# Patient Record
Sex: FEMALE | Race: WHITE | NOT HISPANIC OR LATINO | URBAN - METROPOLITAN AREA
[De-identification: names, ages, dates, MRNs, and addresses within clinical notes are randomized per-mention and may not be internally consistent; named-entity substitution may affect disease eponyms.]

---

## 2017-10-14 ENCOUNTER — OUTPATIENT (OUTPATIENT)
Dept: OUTPATIENT SERVICES | Facility: HOSPITAL | Age: 52
LOS: 1 days | Discharge: HOME | End: 2017-10-14

## 2017-10-14 DIAGNOSIS — Z12.31 ENCOUNTER FOR SCREENING MAMMOGRAM FOR MALIGNANT NEOPLASM OF BREAST: ICD-10-CM

## 2018-06-02 ENCOUNTER — OUTPATIENT (OUTPATIENT)
Dept: OUTPATIENT SERVICES | Facility: HOSPITAL | Age: 53
LOS: 1 days | Discharge: HOME | End: 2018-06-02

## 2018-06-04 DIAGNOSIS — Z00.01 ENCOUNTER FOR GENERAL ADULT MEDICAL EXAMINATION WITH ABNORMAL FINDINGS: ICD-10-CM

## 2018-06-04 DIAGNOSIS — D64.9 ANEMIA, UNSPECIFIED: ICD-10-CM

## 2018-06-04 DIAGNOSIS — R53.83 OTHER FATIGUE: ICD-10-CM

## 2018-06-04 DIAGNOSIS — E55.9 VITAMIN D DEFICIENCY, UNSPECIFIED: ICD-10-CM

## 2018-09-26 ENCOUNTER — OUTPATIENT (OUTPATIENT)
Dept: OUTPATIENT SERVICES | Facility: HOSPITAL | Age: 53
LOS: 1 days | Discharge: HOME | End: 2018-09-26

## 2018-09-26 ENCOUNTER — RESULT REVIEW (OUTPATIENT)
Age: 53
End: 2018-09-26

## 2018-10-03 DIAGNOSIS — Z12.4 ENCOUNTER FOR SCREENING FOR MALIGNANT NEOPLASM OF CERVIX: ICD-10-CM

## 2018-10-06 ENCOUNTER — OUTPATIENT (OUTPATIENT)
Dept: OUTPATIENT SERVICES | Facility: HOSPITAL | Age: 53
LOS: 1 days | Discharge: HOME | End: 2018-10-06

## 2018-10-06 DIAGNOSIS — M54.5 LOW BACK PAIN: ICD-10-CM

## 2018-10-20 ENCOUNTER — OUTPATIENT (OUTPATIENT)
Dept: OUTPATIENT SERVICES | Facility: HOSPITAL | Age: 53
LOS: 1 days | Discharge: HOME | End: 2018-10-20

## 2018-10-20 DIAGNOSIS — Z12.31 ENCOUNTER FOR SCREENING MAMMOGRAM FOR MALIGNANT NEOPLASM OF BREAST: ICD-10-CM

## 2019-10-15 ENCOUNTER — RESULT REVIEW (OUTPATIENT)
Age: 54
End: 2019-10-15

## 2019-10-15 ENCOUNTER — OUTPATIENT (OUTPATIENT)
Dept: OUTPATIENT SERVICES | Facility: HOSPITAL | Age: 54
LOS: 1 days | Discharge: HOME | End: 2019-10-15

## 2019-10-17 DIAGNOSIS — Z12.4 ENCOUNTER FOR SCREENING FOR MALIGNANT NEOPLASM OF CERVIX: ICD-10-CM

## 2020-04-26 ENCOUNTER — MESSAGE (OUTPATIENT)
Age: 55
End: 2020-04-26

## 2020-05-05 ENCOUNTER — APPOINTMENT (OUTPATIENT)
Dept: DISASTER EMERGENCY | Facility: CLINIC | Age: 55
End: 2020-05-05

## 2020-05-07 ENCOUNTER — APPOINTMENT (OUTPATIENT)
Dept: DISASTER EMERGENCY | Facility: HOSPITAL | Age: 55
End: 2020-05-07

## 2020-05-08 LAB
SARS-COV-2 IGG SERPL IA-ACNC: 7 INDEX
SARS-COV-2 IGG SERPL QL IA: POSITIVE

## 2021-04-16 PROBLEM — Z00.00 ENCOUNTER FOR PREVENTIVE HEALTH EXAMINATION: Status: ACTIVE | Noted: 2021-04-16

## 2021-04-20 ENCOUNTER — TRANSCRIPTION ENCOUNTER (OUTPATIENT)
Age: 56
End: 2021-04-20

## 2021-04-20 ENCOUNTER — APPOINTMENT (OUTPATIENT)
Dept: PSYCHIATRY | Facility: CLINIC | Age: 56
End: 2021-04-20
Payer: COMMERCIAL

## 2021-04-20 PROCEDURE — 90791 PSYCH DIAGNOSTIC EVALUATION: CPT | Mod: 95

## 2021-04-30 ENCOUNTER — APPOINTMENT (OUTPATIENT)
Dept: PSYCHIATRY | Facility: CLINIC | Age: 56
End: 2021-04-30

## 2021-05-03 ENCOUNTER — EMERGENCY (EMERGENCY)
Facility: HOSPITAL | Age: 56
LOS: 0 days | Discharge: HOME | End: 2021-05-03
Attending: STUDENT IN AN ORGANIZED HEALTH CARE EDUCATION/TRAINING PROGRAM | Admitting: STUDENT IN AN ORGANIZED HEALTH CARE EDUCATION/TRAINING PROGRAM
Payer: COMMERCIAL

## 2021-05-03 VITALS
OXYGEN SATURATION: 99 % | RESPIRATION RATE: 18 BRPM | WEIGHT: 240.08 LBS | HEART RATE: 77 BPM | DIASTOLIC BLOOD PRESSURE: 72 MMHG | SYSTOLIC BLOOD PRESSURE: 135 MMHG | TEMPERATURE: 96 F

## 2021-05-03 DIAGNOSIS — I10 ESSENTIAL (PRIMARY) HYPERTENSION: ICD-10-CM
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
## 2021-05-03 DIAGNOSIS — R10.31 RIGHT LOWER QUADRANT PAIN: ICD-10-CM
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
## 2021-05-03 DIAGNOSIS — Z88.0 ALLERGY STATUS TO PENICILLIN: ICD-10-CM

## 2021-05-03 PROCEDURE — 99284 EMERGENCY DEPT VISIT MOD MDM: CPT

## 2021-05-03 PROCEDURE — 73502 X-RAY EXAM HIP UNI 2-3 VIEWS: CPT | Mod: 26,RT

## 2021-05-03 RX ORDER — KETOROLAC TROMETHAMINE 30 MG/ML
15 SYRINGE (ML) INJECTION ONCE
Refills: 0 | Status: DISCONTINUED | OUTPATIENT
Start: 2021-05-03 | End: 2021-05-03

## 2021-05-03 RX ADMIN — Medication 15 MILLIGRAM(S): at 08:19

## 2021-05-03 NOTE — ED PROVIDER NOTE - NSFOLLOWUPINSTRUCTIONS_ED_ALL_ED_FT
- Please follow up with Physical therapy rehab  - You may continue taking your pain medications and rest for the next few days      Groin Pain    WHAT YOU NEED TO KNOW:    Groin pain may be felt only in your groin, or it may spread to your buttocks, thigh, or knee. An injury to your hip joint, pelvic area, lower back, or thighs can cause groin pain.    DISCHARGE INSTRUCTIONS:    Medicines: You may need any of the following:     NSAIDs, such as ibuprofen, help decrease swelling, pain, and fever. This medicine is available with or without a doctor's order. NSAIDs can cause stomach bleeding or kidney problems in certain people. If you take blood thinner medicine, always ask if NSAIDs are safe for you. Always read the medicine label and follow directions. Do not give these medicines to children under 6 months of age without direction from your child's healthcare provider.      Acetaminophen decreases pain. It is available without a doctor's order. Ask how much to take and how often to take it. Follow directions. Acetaminophen can cause liver damage if not taken correctly.       Take your medicine as directed. Contact your healthcare provider if you think your medicine is not helping or if you have side effects. Tell him of her if you are allergic to any medicine. Keep a list of the medicines, vitamins, and herbs you take. Include the amounts, and when and why you take them. Bring the list or the pill bottles to follow-up visits. Carry your medicine list with you in case of an emergency.    Follow up with your healthcare provider as directed: You may need to return for more tests. Write down your questions so you remember to ask them during your visits.     Self-care:     Rest as much as possible. Avoid activities that cause or increase your pain.      Apply ice on your groin for 15 to 20 minutes every hour or as directed. Use an ice pack, or put crushed ice in a plastic bag. Cover it with a towel. Ice helps prevent tissue damage and decreases swelling and pain.       Apply heat on your groin for 20 to 30 minutes every 2 hours for as many days as directed. Heat helps decrease pain and muscle spasms.     Contact your healthcare provider if:     You have a fever.       You have questions or concerns about your condition or care.    Return to the emergency department if:     You have severe pain even after you take medicine.       You have pain or burning when you urinate.       You have pain on your side that spreads to your groin.         © Copyright [employer] 2019 All illustrations and images included in CareNotes are the copyrighted property of [email or web address] or [employer].

## 2021-05-03 NOTE — ED PROVIDER NOTE - PROGRESS NOTE DETAILS
AH - xray unremarkable; pt understands outpatient management, rehab; Patient to be discharged from ED. Any available test results were discussed with patient and/or family. Verbal instructions given, including instructions to return to ED immediately for any new, worsening, or concerning symptoms. Strict return precautions given. Patient endorsed understanding. Written discharge instructions additionally given, including follow-up plan

## 2021-05-03 NOTE — ED PROVIDER NOTE - CLINICAL SUMMARY MEDICAL DECISION MAKING FREE TEXT BOX
xr negative, pt ambulatory w/ steady gait w/ supportive care measures. will dc w/ PT for f/u, return precautions. more likely muscle sprain

## 2021-05-03 NOTE — ED ADULT TRIAGE NOTE - CHIEF COMPLAINT QUOTE
Pt presents to ED c/o fall last week. Pt fell from front porch steps. No LOC. No head trauma. No A/C Pt c/o R groin pain.

## 2021-05-03 NOTE — ED PROVIDER NOTE - NSFOLLOWUPCLINICS_GEN_ALL_ED_FT
[facility] ([facility])  Rehabilitation  [address] [place] [address]  Phone: [phone]  Fax:   Follow Up Time: Urgent

## 2021-05-03 NOTE — ED PROVIDER NOTE - CARE PROVIDER_API CALL
Max Aparicio (DO)  Internal Medicine  [address] [place] [address]  Phone: [phone]  Fax: [phone]  Follow Up Time: 4-6 Days

## 2021-05-03 NOTE — ED PROVIDER NOTE - NS ED ROS FT
Review of Systems:  CONSTITUTIONAL: No fever, No diaphoresis  SKIN: No rash  RESPIRATORY: No shortness of breath, No cough  CARDIAC: No chest pain, No palpitations  GI: No abdominal pain, No nausea, No vomiting, No diarrhea  MUSCULOSKELETAL: No joint paint, No swelling, No back pain, + groin pain  NEUROLOGIC: No numbness, No focal weakness, No headache, No dizziness  All other systems negative, unless specified in HPI

## 2021-05-03 NOTE — ED PROVIDER NOTE - OBJECTIVE STATEMENT
54 yo F with PMH HTN who presents with persistent R groin pain s/p fall down stairs 1 week ago.  Pt states she fell forward down 4-5 steps onto outstretched hands and strained groin.  No head injury, LOC, not on blood thinners.  Has been trying Advil, tizanidine and topical pain reliever with minimal relief.  Pt denies any focal weakness, abdominal pain, dysuria, paresthesias, incontinence, numbness, tingling, back pain, n/v.

## 2021-05-03 NOTE — ED PROVIDER NOTE - PHYSICAL EXAMINATION
CONSTITUTIONAL: Well-developed; well-nourished; in no acute distress  HEAD: Normocephalic; atraumatic  NECK: Supple; non tender  CARD:  Regular rate and rhythm  RESP: CTAB  ABD: Soft NTND  EXT: Normal ROM.  No clubbing or cyanosis.  No edema; mild R groin TTP  NEURO: A&O x3, grossly unremarkable, no focal deficits; 4/5 in RLE flexion/extension and adduction, ambulating with mild limp; sensation in tact, 2+ pulses  PSYCH: Cooperative, appropriate

## 2021-05-03 NOTE — ED PROVIDER NOTE - ATTENDING CONTRIBUTION TO CARE
56 yo F with PMH HTN presents for eval s/p fall 1 week w/ persistent R groin pain. pt fell forward down 4-5 steps. pt FOOSHed and belives she twisted her neck. pt tried nsaid, msk relaxant and topical med w/ minimal relief. no ap, dysuria, numbness, weakness.    vss  gen- NAD, aaox3  card-rrr  lungs-ctab, no wheezing or rhonchi  abd-sntnd, no guarding or rebound  neuro- full sensation, mild limitation of str to RLE 2/2 pain, cn ii-xii grossly intact, normal coordination and gait    will get xr, will provide supportive care, serial exam and ED observation period 54 yo F with PMH HTN presents for eval s/p fall 1 week w/ persistent R groin pain. pt fell forward down 4-5 steps. pt FOOSHed and belives she twisted her hip, pt tried nsaid, msk relaxant and topical med w/ minimal relief. no ap, dysuria, numbness, weakness.    vss  gen- NAD, aaox3  card-rrr  lungs-ctab, no wheezing or rhonchi  abd-sntnd, no guarding or rebound  neuro- full sensation, mild limitation of str to RLE 2/2 pain, cn ii-xii grossly intact, normal coordination and gait    will get xr, will provide supportive care, serial exam and ED observation period

## 2021-05-03 NOTE — ED PROVIDER NOTE - PATIENT PORTAL LINK FT
You can access the FollowMyHealth Patient Portal offered by [facility] by registering at the following website: [email or web address]. By joining [employer] [facility]’s FollowMyHealth portal, you will also be able to view your health information using other applications (apps) compatible with our system.

## 2021-05-07 ENCOUNTER — APPOINTMENT (OUTPATIENT)
Dept: PSYCHIATRY | Facility: CLINIC | Age: 56
End: 2021-05-07
Payer: COMMERCIAL

## 2021-05-07 PROCEDURE — 90837 PSYTX W PT 60 MINUTES: CPT | Mod: 95

## 2021-05-14 ENCOUNTER — APPOINTMENT (OUTPATIENT)
Dept: PSYCHIATRY | Facility: CLINIC | Age: 56
End: 2021-05-14
Payer: COMMERCIAL

## 2021-05-14 PROCEDURE — 90837 PSYTX W PT 60 MINUTES: CPT | Mod: 95

## 2021-05-21 ENCOUNTER — APPOINTMENT (OUTPATIENT)
Dept: PSYCHIATRY | Facility: CLINIC | Age: 56
End: 2021-05-21

## 2021-05-28 ENCOUNTER — OUTPATIENT (OUTPATIENT)
Dept: OUTPATIENT SERVICES | Facility: HOSPITAL | Age: 56
LOS: 1 days | Discharge: HOME | End: 2021-05-28

## 2021-05-28 ENCOUNTER — APPOINTMENT (OUTPATIENT)
Dept: PSYCHIATRY | Facility: CLINIC | Age: 56
End: 2021-05-28
Payer: COMMERCIAL

## 2021-05-28 DIAGNOSIS — S73.101D: ICD-10-CM

## 2021-05-28 DIAGNOSIS — S73.101A UNSPECIFIED SPRAIN OF RIGHT HIP, INITIAL ENCOUNTER: ICD-10-CM

## 2021-05-28 PROCEDURE — 90837 PSYTX W PT 60 MINUTES: CPT | Mod: 95

## 2021-06-04 ENCOUNTER — APPOINTMENT (OUTPATIENT)
Dept: PSYCHIATRY | Facility: CLINIC | Age: 56
End: 2021-06-04

## 2021-06-11 ENCOUNTER — APPOINTMENT (OUTPATIENT)
Dept: PSYCHIATRY | Facility: CLINIC | Age: 56
End: 2021-06-11
Payer: COMMERCIAL

## 2021-06-11 PROCEDURE — 90837 PSYTX W PT 60 MINUTES: CPT | Mod: 95

## 2021-06-18 ENCOUNTER — APPOINTMENT (OUTPATIENT)
Dept: PSYCHIATRY | Facility: CLINIC | Age: 56
End: 2021-06-18
Payer: COMMERCIAL

## 2021-06-18 PROCEDURE — 90837 PSYTX W PT 60 MINUTES: CPT | Mod: 95

## 2021-07-02 ENCOUNTER — APPOINTMENT (OUTPATIENT)
Dept: PSYCHIATRY | Facility: CLINIC | Age: 56
End: 2021-07-02

## 2021-07-23 ENCOUNTER — APPOINTMENT (OUTPATIENT)
Dept: PSYCHIATRY | Facility: CLINIC | Age: 56
End: 2021-07-23
Payer: COMMERCIAL

## 2021-07-23 PROCEDURE — 90837 PSYTX W PT 60 MINUTES: CPT | Mod: 95

## 2021-07-26 ENCOUNTER — RESULT REVIEW (OUTPATIENT)
Age: 56
End: 2021-07-26

## 2021-07-31 ENCOUNTER — OUTPATIENT (OUTPATIENT)
Dept: OUTPATIENT SERVICES | Facility: HOSPITAL | Age: 56
LOS: 1 days | Discharge: HOME | End: 2021-07-31
Payer: COMMERCIAL

## 2021-07-31 DIAGNOSIS — Z12.31 ENCOUNTER FOR SCREENING MAMMOGRAM FOR MALIGNANT NEOPLASM OF BREAST: ICD-10-CM

## 2021-07-31 PROCEDURE — 77063 BREAST TOMOSYNTHESIS BI: CPT | Mod: 26

## 2021-07-31 PROCEDURE — 77067 SCR MAMMO BI INCL CAD: CPT | Mod: 26

## 2021-08-27 ENCOUNTER — APPOINTMENT (OUTPATIENT)
Dept: PSYCHIATRY | Facility: CLINIC | Age: 56
End: 2021-08-27
Payer: COMMERCIAL

## 2021-08-27 PROCEDURE — 90837 PSYTX W PT 60 MINUTES: CPT | Mod: 95

## 2021-09-10 ENCOUNTER — APPOINTMENT (OUTPATIENT)
Dept: PSYCHIATRY | Facility: CLINIC | Age: 56
End: 2021-09-10
Payer: COMMERCIAL

## 2021-09-10 DIAGNOSIS — F32.1 MAJOR DEPRESSIVE DISORDER, SINGLE EPISODE, MODERATE: ICD-10-CM

## 2021-09-10 PROCEDURE — 90837 PSYTX W PT 60 MINUTES: CPT | Mod: 95

## 2021-10-25 ENCOUNTER — NON-APPOINTMENT (OUTPATIENT)
Age: 56
End: 2021-10-25

## 2022-04-27 ENCOUNTER — APPOINTMENT (OUTPATIENT)
Dept: SURGERY | Facility: CLINIC | Age: 57
End: 2022-04-27
Payer: COMMERCIAL

## 2022-04-27 VITALS
BODY MASS INDEX: 41.61 KG/M2 | HEART RATE: 76 BPM | HEIGHT: 65.5 IN | WEIGHT: 252.8 LBS | SYSTOLIC BLOOD PRESSURE: 144 MMHG | OXYGEN SATURATION: 98 % | DIASTOLIC BLOOD PRESSURE: 96 MMHG | TEMPERATURE: 96.2 F

## 2022-04-27 DIAGNOSIS — Z78.9 OTHER SPECIFIED HEALTH STATUS: ICD-10-CM

## 2022-04-27 PROCEDURE — 99205 OFFICE O/P NEW HI 60 MIN: CPT

## 2022-04-27 NOTE — ASSESSMENT
[FreeTextEntry1] : 55yo female with PMHx of HTN, class III obesity BMI 41.4 presenting for consultation of medical/surgical weight loss.\par -explained medical versus surgical options with patient - phentermine, topiramate, ozempic and contrave\par -discussed medical and surgical options - sleeve gastrectomy \par -explained expected outcomes from medical weight loss program - 10% TBW (25lb)\par -patient instructed to call insurance company to find out about medication coverage\par -blood work and EKG ordered\par -f/u in 3 weeks to discuss surgery versus medical weight loss

## 2022-04-27 NOTE — PHYSICAL EXAM
[Obese, well nourished, in no acute distress] : obese, well nourished, in no acute distress [Normal] : affect appropriate [de-identified] : soft, non-tender, no rebound/guarding, no scars/hernias/masses [de-identified] : no CVA tenderness B/L

## 2022-04-27 NOTE — HISTORY OF PRESENT ILLNESS
[de-identified] : 57yo female with PMHx of HTN, class III obesity BMI 41.4 presenting for consultation of weight loss surgery/management. Patient states she has struggled with her weight for her entire life, but feels that it has become worse since COVID. Previous weight loss attempts include various diet and exercise regimens with limited success. She had COVID but never intubated. She reports knee pains but never had steroid injections. She states that she has occasional heartburn but that is self-limited. She has never had an EGD or colonoscopy.\par

## 2022-05-17 LAB
25(OH)D3 SERPL-MCNC: 15 NG/ML
ALBUMIN SERPL ELPH-MCNC: 4.3 G/DL
ALP BLD-CCNC: 75 U/L
ALT SERPL-CCNC: 26 U/L
ANION GAP SERPL CALC-SCNC: 15 MMOL/L
AST SERPL-CCNC: 19 U/L
BASOPHILS # BLD AUTO: 0.05 K/UL
BASOPHILS NFR BLD AUTO: 0.7 %
BILIRUB SERPL-MCNC: 0.3 MG/DL
BUN SERPL-MCNC: 19 MG/DL
CALCIUM SERPL-MCNC: 9.4 MG/DL
CHLORIDE SERPL-SCNC: 107 MMOL/L
CHOLEST SERPL-MCNC: 207 MG/DL
CO2 SERPL-SCNC: 23 MMOL/L
CREAT SERPL-MCNC: 0.8 MG/DL
EGFR: 86 ML/MIN/1.73M2
EOSINOPHIL # BLD AUTO: 0.16 K/UL
EOSINOPHIL NFR BLD AUTO: 2.1 %
ESTIMATED AVERAGE GLUCOSE: 108 MG/DL
FERRITIN SERPL-MCNC: 31 NG/ML
FOLATE RBC-MCNC: 1239 NG/ML
GLUCOSE SERPL-MCNC: 129 MG/DL
HBA1C MFR BLD HPLC: 5.4 %
HCT VFR BLD CALC: 43.6 %
HCT VFR BLD CALC: 43.6 %
HDLC SERPL-MCNC: 59 MG/DL
HGB BLD-MCNC: 13.6 G/DL
IMM GRANULOCYTES NFR BLD AUTO: 0.5 %
IRON SATN MFR SERPL: 14 %
IRON SERPL-MCNC: 46 UG/DL
LDLC SERPL CALC-MCNC: 119 MG/DL
LYMPHOCYTES # BLD AUTO: 1.45 K/UL
LYMPHOCYTES NFR BLD AUTO: 19 %
MAN DIFF?: NORMAL
MCHC RBC-ENTMCNC: 27.6 PG
MCHC RBC-ENTMCNC: 31.2 G/DL
MCV RBC AUTO: 88.4 FL
MONOCYTES # BLD AUTO: 0.37 K/UL
MONOCYTES NFR BLD AUTO: 4.8 %
NEUTROPHILS # BLD AUTO: 5.57 K/UL
NEUTROPHILS NFR BLD AUTO: 72.9 %
NONHDLC SERPL-MCNC: 148 MG/DL
PLATELET # BLD AUTO: 233 K/UL
POTASSIUM SERPL-SCNC: 4.2 MMOL/L
PROT SERPL-MCNC: 6.5 G/DL
RBC # BLD: 4.93 M/UL
RBC # FLD: 13.6 %
SODIUM SERPL-SCNC: 145 MMOL/L
T3FREE SERPL-MCNC: 2.84 PG/ML
T4 FREE SERPL-MCNC: 1 NG/DL
TIBC SERPL-MCNC: 331 UG/DL
TRIGL SERPL-MCNC: 144 MG/DL
TSH SERPL-ACNC: 1.73 UIU/ML
UIBC SERPL-MCNC: 285 UG/DL
VIT B1 SERPL-MCNC: 145.6 NMOL/L
VIT B12 SERPL-MCNC: 448 PG/ML
WBC # FLD AUTO: 7.64 K/UL

## 2022-05-20 ENCOUNTER — APPOINTMENT (OUTPATIENT)
Dept: BARIATRICS | Facility: HOSPITAL | Age: 57
End: 2022-05-20

## 2022-06-10 ENCOUNTER — APPOINTMENT (OUTPATIENT)
Dept: SURGERY | Facility: CLINIC | Age: 57
End: 2022-06-10
Payer: COMMERCIAL

## 2022-06-10 VITALS
BODY MASS INDEX: 41.15 KG/M2 | SYSTOLIC BLOOD PRESSURE: 125 MMHG | OXYGEN SATURATION: 98 % | DIASTOLIC BLOOD PRESSURE: 81 MMHG | HEART RATE: 95 BPM | TEMPERATURE: 97.2 F | HEIGHT: 65.5 IN | WEIGHT: 250 LBS

## 2022-06-10 PROCEDURE — 99212 OFFICE O/P EST SF 10 MIN: CPT

## 2022-06-10 RX ORDER — LIRAGLUTIDE 6 MG/ML
18 INJECTION, SOLUTION SUBCUTANEOUS DAILY
Qty: 4 | Refills: 3 | Status: DISCONTINUED | COMMUNITY
Start: 2022-06-10 | End: 2022-06-10

## 2022-06-10 NOTE — ASSESSMENT
[FreeTextEntry1] : 55yo female with PMHx of HTN, class III obesity BMI 41.4 presenting for MWL follow up.\par -blood work and EKG reviewed with patient\par -vitamin D low - recommended supplementation\par -coverage of medications reviewed\par -Saxenda 0.6mg Rx written to [facility] - contacted pharmacy for preauthorization\par -bowel regimen given - miralax BID, colace PRN, hydration\par -dietary recommendations - high protein, low fat, low carb diet, 1-2 protein shakes per day as meal replacement, allow 1 high protein snack\par -exercise recommendations - 150 minutes per week of cardio and resistance training, patient is planning to join a gym on [address]\par -follow up in 1 month\par -call with concerns

## 2022-06-10 NOTE — HISTORY OF PRESENT ILLNESS
Patient educated on anticoagulation therapy. verbalized understanding.  States pain has not changed [FreeTextEntry1] : 57yo female with PMHx of HTN, class III obesity BMI 41.4 presenting for MWL follow up. Labs were done and reviewed with patient via telephone. EKG done, patient brought copy with her to visit. She also called her insurance company for weight loss medication coverage. Patient mentioned constipation recently.

## 2022-07-08 ENCOUNTER — APPOINTMENT (OUTPATIENT)
Dept: SURGERY | Facility: CLINIC | Age: 57
End: 2022-07-08

## 2022-07-08 VITALS
HEIGHT: 65.5 IN | BODY MASS INDEX: 41.15 KG/M2 | DIASTOLIC BLOOD PRESSURE: 80 MMHG | WEIGHT: 250 LBS | OXYGEN SATURATION: 98 % | SYSTOLIC BLOOD PRESSURE: 118 MMHG | HEART RATE: 80 BPM | TEMPERATURE: 97 F

## 2022-07-08 PROCEDURE — 99212 OFFICE O/P EST SF 10 MIN: CPT

## 2022-07-08 NOTE — ASSESSMENT
[FreeTextEntry1] : 57yo female with PMHx of HTN, class III obesity presenting for MWL follow up.\par -Rx for Saxenda pending pharmacy - contacted pharmacy via emery and will follow up\par -f/u in 4 weeks after starting medication\par

## 2022-07-08 NOTE — HISTORY OF PRESENT ILLNESS
[FreeTextEntry1] : 57yo female with PMHx of class III obesity presenting for medical weight loss follow up. Patient has been compliant with dietary recommendations, though exercise has been difficult. She says she will put more effort into making time for daily dedicated activity. She has been awaiting prescription from pharmacy still, so she has not started taking her medication.

## 2022-10-28 ENCOUNTER — OUTPATIENT (OUTPATIENT)
Dept: OUTPATIENT SERVICES | Facility: HOSPITAL | Age: 57
LOS: 1 days | Discharge: HOME | End: 2022-10-28

## 2022-10-28 DIAGNOSIS — Z12.31 ENCOUNTER FOR SCREENING MAMMOGRAM FOR MALIGNANT NEOPLASM OF BREAST: ICD-10-CM

## 2022-10-28 PROCEDURE — 77067 SCR MAMMO BI INCL CAD: CPT | Mod: 26

## 2022-10-28 PROCEDURE — 77063 BREAST TOMOSYNTHESIS BI: CPT | Mod: 26

## 2022-11-22 ENCOUNTER — APPOINTMENT (OUTPATIENT)
Dept: GASTROENTEROLOGY | Facility: CLINIC | Age: 57
End: 2022-11-22

## 2022-11-22 DIAGNOSIS — Z82.49 FAMILY HISTORY OF ISCHEMIC HEART DISEASE AND OTHER DISEASES OF THE CIRCULATORY SYSTEM: ICD-10-CM

## 2022-11-22 DIAGNOSIS — Z86.79 PERSONAL HISTORY OF OTHER DISEASES OF THE CIRCULATORY SYSTEM: ICD-10-CM

## 2022-11-22 PROCEDURE — 99204 OFFICE O/P NEW MOD 45 MIN: CPT | Mod: 95

## 2022-11-22 RX ORDER — MULTIVITAMIN
TABLET ORAL
Refills: 0 | Status: ACTIVE | COMMUNITY

## 2022-11-22 RX ORDER — PEN NEEDLE, DIABETIC 32 GX 1/4"
32G X 6 MM NEEDLE, DISPOSABLE MISCELLANEOUS
Qty: 100 | Refills: 0 | Status: DISCONTINUED | COMMUNITY
Start: 2022-07-18 | End: 2022-11-22

## 2022-11-22 RX ORDER — ERGOCALCIFEROL 1.25 MG/1
1.25 MG CAPSULE, LIQUID FILLED ORAL
Qty: 4 | Refills: 3 | Status: DISCONTINUED | COMMUNITY
Start: 2022-05-17 | End: 2022-11-22

## 2022-11-22 RX ORDER — LIRAGLUTIDE 6 MG/ML
18 INJECTION, SOLUTION SUBCUTANEOUS DAILY
Qty: 4 | Refills: 3 | Status: DISCONTINUED | COMMUNITY
Start: 2022-06-10 | End: 2022-11-22

## 2022-11-22 RX ORDER — BIOTIN 10 MG
TABLET ORAL
Refills: 0 | Status: ACTIVE | COMMUNITY

## 2022-11-22 NOTE — PHYSICAL EXAM
[Alert] : alert [Sclera] : the sclera and conjunctiva were normal [Hearing Threshold Finger Rub Not [place]] : hearing was normal [No Respiratory Distress] : no respiratory distress [Normal Color / Pigmentation] : normal skin color and pigmentation [Oriented To Time, Place, And Person] : oriented to person, place, and time

## 2022-11-22 NOTE — HISTORY OF PRESENT ILLNESS
[Home] : at home, [unfilled] , at the time of the visit. [Medical Office: ([facility])___] : at the medical office located in  [Verbal consent obtained from patient] : the patient, [unfilled] [FreeTextEntry4] : Geovanna Burden  [FreeTextEntry1] : 57 year old female patient average risk for CRC , presents for her first screening colonoscopy.\par Denies any GI complaints.

## 2022-11-22 NOTE — ASSESSMENT
[FreeTextEntry1] : 57 year old female patient average risk for CRC , presents for her first screening colonoscopy.\par Denies any GI complaints. \par \par Screening colonoscopy\par Risks and benefits discussed with patient.\par

## 2023-04-13 ENCOUNTER — TRANSCRIPTION ENCOUNTER (OUTPATIENT)
Age: 58
End: 2023-04-13

## 2023-04-13 ENCOUNTER — RESULT REVIEW (OUTPATIENT)
Age: 58
End: 2023-04-13

## 2023-04-13 ENCOUNTER — OUTPATIENT (OUTPATIENT)
Dept: INPATIENT UNIT | Facility: HOSPITAL | Age: 58
LOS: 1 days | Discharge: HOME | End: 2023-04-13
Payer: COMMERCIAL

## 2023-04-13 VITALS
RESPIRATION RATE: 20 BRPM | HEART RATE: 96 BPM | TEMPERATURE: 98 F | SYSTOLIC BLOOD PRESSURE: 134 MMHG | HEIGHT: 66 IN | WEIGHT: 250 LBS | DIASTOLIC BLOOD PRESSURE: 82 MMHG

## 2023-04-13 VITALS
RESPIRATION RATE: 18 BRPM | DIASTOLIC BLOOD PRESSURE: 67 MMHG | OXYGEN SATURATION: 100 % | HEART RATE: 76 BPM | SYSTOLIC BLOOD PRESSURE: 139 MMHG

## 2023-04-13 DIAGNOSIS — Z12.11 ENCOUNTER FOR SCREENING FOR MALIGNANT NEOPLASM OF COLON: ICD-10-CM

## 2023-04-13 PROCEDURE — 88305 TISSUE EXAM BY PATHOLOGIST: CPT

## 2023-04-13 PROCEDURE — 88305 TISSUE EXAM BY PATHOLOGIST: CPT | Mod: 26

## 2023-04-13 PROCEDURE — 45380 COLONOSCOPY AND BIOPSY: CPT

## 2023-04-13 RX ORDER — METOPROLOL TARTRATE 50 MG
0 TABLET ORAL
Refills: 0 | DISCHARGE

## 2023-04-13 NOTE — H&P PST ADULT - RESPIRATORY
Constitutional: well-developed, normal communication ability.   Eyes: Conjunctivae and eyelids appear normal, no scleral icterus. Respiratory: lungs clear to auscultation bilaterally, no rales or wheezing, no crackles   Cardiovascular: No edema, no murmurs or gallops appreciated.   Gastrointestinal:  normoactive bowel sounds, soft, no tenderness, no rebound tenderness, no shifting dullness, no organomegaly.   Musculoskeletal: normal gait and station   Skin: no jaundice   Neurologic: Oriented to person, place, time. Short term memory intact.    Psychiatric: Normal mood and appropriate affect.
normal/clear to auscultation bilaterally/no wheezes/no rales/no rhonchi

## 2023-04-13 NOTE — ASU DISCHARGE PLAN (ADULT/PEDIATRIC) - NS MD DC FALL RISK RISK
For information on Fall & Injury Prevention, visit: [email or web address] OR  [email or web address] OR  https://www.cdc.gov/steadi/patient.html

## 2023-04-13 NOTE — ASU DISCHARGE PLAN (ADULT/PEDIATRIC) - CARE PROVIDER_API CALL
Yulisa Kelly (MD)  Gastroenterology  [address] [place] [address]  Phone: [phone]  Fax: [phone]  Follow Up Time:

## 2023-04-13 NOTE — CHART NOTE - NSCHARTNOTEFT_GEN_A_CORE
PACU ANESTHESIA ADMISSION NOTE      Procedure:   Post op diagnosis:      ____  Intubated  TV:______       Rate: ______      FiO2: ______    __x__  Patent Airway    _x___  Full return of protective reflexes    ___x_  Full recovery from anesthesia / back to baseline status    Vitals:  T(C): 36.7 (04-13-23 @ 16:23), Max: 36.7 (04-13-23 @ 16:23)  HR: 93 (04-13-23 @ 17:14) (96 - 96)  BP: 135/66 (04-13-23 @ 17:14) (134/82 - 134/82)  RR: 17 (04-13-23 @ 17:14) (20 - 20)  SpO2: 98%    Mental Status:  __x__ Awake   ____x_ Alert   _____ Drowsy   _____ Sedated    Nausea/Vomiting:  __x__ NO  ______Yes,   See Post - Op Orders          Pain Scale (0-10):  ___5__    Treatment: ____ None    ___x_ See Post - Op/PCA Orders    Post - Operative Fluids:   ____ Oral   __x__ See Post - Op Orders    Plan: Discharge:   _x___Home       _____Floor     _____Critical Care    _____  Other:_________________    Comments: Transferred care to PACU RN; discharge to home when criteria met.

## 2023-04-17 LAB — SURGICAL PATHOLOGY STUDY: SIGNIFICANT CHANGE UP

## 2023-04-19 DIAGNOSIS — I10 ESSENTIAL (PRIMARY) HYPERTENSION: ICD-10-CM

## 2023-04-19 DIAGNOSIS — Z12.11 ENCOUNTER FOR SCREENING FOR MALIGNANT NEOPLASM OF COLON: ICD-10-CM

## 2023-04-19 DIAGNOSIS — K64.8 OTHER HEMORRHOIDS: ICD-10-CM

## 2023-04-19 DIAGNOSIS — K57.30 DIVERTICULOSIS OF LARGE INTESTINE WITHOUT PERFORATION OR ABSCESS WITHOUT BLEEDING: ICD-10-CM

## 2023-04-19 DIAGNOSIS — Z88.0 ALLERGY STATUS TO PENICILLIN: ICD-10-CM

## 2023-04-19 DIAGNOSIS — E66.01 MORBID (SEVERE) OBESITY DUE TO EXCESS CALORIES: ICD-10-CM

## 2023-04-19 DIAGNOSIS — Z88.2 ALLERGY STATUS TO SULFONAMIDES: ICD-10-CM

## 2023-07-03 PROBLEM — I10 ESSENTIAL (PRIMARY) HYPERTENSION: Chronic | Status: ACTIVE | Noted: 2023-04-13

## 2023-08-21 ENCOUNTER — OUTPATIENT (OUTPATIENT)
Dept: OUTPATIENT SERVICES | Facility: HOSPITAL | Age: 58
LOS: 1 days | End: 2023-08-21
Payer: COMMERCIAL

## 2023-08-21 DIAGNOSIS — Z13.820 ENCOUNTER FOR SCREENING FOR OSTEOPOROSIS: ICD-10-CM

## 2023-08-21 DIAGNOSIS — Z00.8 ENCOUNTER FOR OTHER GENERAL EXAMINATION: ICD-10-CM

## 2023-08-21 PROCEDURE — 77080 DXA BONE DENSITY AXIAL: CPT

## 2023-08-22 DIAGNOSIS — Z13.820 ENCOUNTER FOR SCREENING FOR OSTEOPOROSIS: ICD-10-CM

## 2024-01-04 ENCOUNTER — APPOINTMENT (OUTPATIENT)
Dept: SURGERY | Facility: CLINIC | Age: 59
End: 2024-01-04
Payer: SELF-PAY

## 2024-01-04 VITALS
TEMPERATURE: 96.8 F | DIASTOLIC BLOOD PRESSURE: 92 MMHG | SYSTOLIC BLOOD PRESSURE: 124 MMHG | BODY MASS INDEX: 43.62 KG/M2 | WEIGHT: 265 LBS | HEIGHT: 65.5 IN | HEART RATE: 73 BPM | OXYGEN SATURATION: 94 %

## 2024-01-04 PROCEDURE — 99204 OFFICE O/P NEW MOD 45 MIN: CPT

## 2024-01-22 ENCOUNTER — APPOINTMENT (OUTPATIENT)
Dept: SURGERY | Facility: CLINIC | Age: 59
End: 2024-01-22
Payer: COMMERCIAL

## 2024-01-22 ENCOUNTER — APPOINTMENT (OUTPATIENT)
Dept: SURGERY | Facility: CLINIC | Age: 59
End: 2024-01-22

## 2024-01-22 PROCEDURE — 97802 MEDICAL NUTRITION INDIV IN: CPT

## 2024-01-24 VITALS — HEIGHT: 65.5 IN

## 2024-02-01 ENCOUNTER — APPOINTMENT (OUTPATIENT)
Dept: SURGERY | Facility: CLINIC | Age: 59
End: 2024-02-01

## 2024-02-01 ENCOUNTER — APPOINTMENT (OUTPATIENT)
Dept: SURGERY | Facility: CLINIC | Age: 59
End: 2024-02-01
Payer: COMMERCIAL

## 2024-02-01 DIAGNOSIS — Z83.3 FAMILY HISTORY OF DIABETES MELLITUS: ICD-10-CM

## 2024-02-01 DIAGNOSIS — Z86.59 PERSONAL HISTORY OF OTHER MENTAL AND BEHAVIORAL DISORDERS: ICD-10-CM

## 2024-02-01 DIAGNOSIS — Z83.438 FAMILY HISTORY OF OTHER DISORDER OF LIPOPROTEIN METABOLISM AND OTHER LIPIDEMIA: ICD-10-CM

## 2024-02-01 DIAGNOSIS — Z29.9 ENCOUNTER FOR PROPHYLACTIC MEASURES, UNSPECIFIED: ICD-10-CM

## 2024-02-01 PROCEDURE — 99214 OFFICE O/P EST MOD 30 MIN: CPT

## 2024-02-01 PROCEDURE — G2211 COMPLEX E/M VISIT ADD ON: CPT

## 2024-02-02 PROBLEM — Z83.438 FAMILY HISTORY OF HYPERLIPIDEMIA: Status: ACTIVE | Noted: 2024-02-02

## 2024-02-02 PROBLEM — Z29.9 PROPHYLACTIC MEASURE: Status: RESOLVED | Noted: 2022-04-22 | Resolved: 2024-02-02

## 2024-02-02 PROBLEM — Z86.59 HISTORY OF DEPRESSION: Status: RESOLVED | Noted: 2024-02-02 | Resolved: 2024-02-02

## 2024-02-02 PROBLEM — Z83.3 FAMILY HISTORY OF TYPE 2 DIABETES MELLITUS: Status: ACTIVE | Noted: 2024-02-02

## 2024-02-02 RX ORDER — ACETAMINOPHEN 500 MG
TABLET ORAL
Refills: 0 | Status: ACTIVE | COMMUNITY

## 2024-02-02 RX ORDER — SODIUM SULFATE, MAGNESIUM SULFATE, AND POTASSIUM CHLORIDE 17.75; 2.7; 2.25 G/1; G/1; G/1
1479-225-188 TABLET ORAL
Qty: 24 | Refills: 0 | Status: DISCONTINUED | COMMUNITY
Start: 2022-11-22 | End: 2024-02-02

## 2024-02-02 RX ORDER — CALCIUM CARBONATE 500 MG/1
TABLET, CHEWABLE ORAL
Refills: 0 | Status: ACTIVE | COMMUNITY

## 2024-02-02 NOTE — HISTORY OF PRESENT ILLNESS
[de-identified] : Referred by: self-referred  HPI: 58 year old female here for her f/u medical weight loss appointment. PMHx is significant for class 3 obesity, HTN, GERD, metabolic syndrome, and risk factors for sleep apnea.   Weight History: Highest Weight:  265 lbs/44.1 kg/m2  Lowest Weight: 155 lbs (~22 yrs ago) Current Weight: deferred today due to time  Since we last met, she reports that she started WWs again, but then fell off due to a number of social events. Has started to walk with colleagues during her lunch break 3 days/week. Has been working to decrease her salt intake.   Most difficult times of day for her are at 3 pm and 9:30 - struggles most with hunger. May snack on chips. Often eats her dinner while driving home in her car ~9:30pm.  Has not yet had an opportunity to connect with sleep medicine as discussed during our last appointment.  Pertinent Labs: Labs were notable for elevated TChol 233 and . Also had elevated FBG of 101.   Previous Weight Loss Efforts: Has previously tried Saxenda in the past. Tried for several months with minimal effect. Does not recall the highest dose tried. Tolerated well with minimal side effects.  Previous use of AOMs: Has previously tried Saxenda in the past. Tried for several months with minimal effect. Does not recall the highest dose tried. Tolerated well with minimal side effects. Has also tried bupropion for tx of depression. Did not notice any weight change or change in appetite while on it. Has also tried WWs. Was able to lose ~50 lbs in the past, before becoming pregnant.  Has also tried Golo and IF. Tried Premiere protein shakes in the past --> constipation.   Medications that may have contributed to weight gain: metoprolol - has not noticed any effect on her weight since starting.   Eating behaviors: grazing emotional eating, especially when stressed Fast food (often eats dinner in her car, on the way home) +salt cravings No longer eating red meat  SocHx: Works 2 jobs at [email or web address] (6:30 am - 9:30 M-F and Saturday) Lives with adult son and nephew Former smoker  Sleep: Short sleep duration. Goes to bed ~11:30 and wakes up at 4:30 AM +Snores Occ snoring awakes her from sleep Does reports feeling rested on most days Sleeps more on weekends  Reproductive/Preventive Screenings: LMP 2018 UTD pap, mammo, and colonscopy

## 2024-02-02 NOTE — PHYSICAL EXAM
[No Rash or Lesion] : No rash or lesion [Alert] : alert [Oriented to Person] : oriented to person [Oriented to Place] : oriented to place [Oriented to Time] : oriented to time [Calm] : calm [de-identified] : Well appearing female in NAD [de-identified] : CNs II-XII grossly intact [de-identified] : No increased WOB

## 2024-02-02 NOTE — REVIEW OF SYSTEMS
[SOB on Exertion] : shortness of breath during exertion [Limb Swelling] : limb swelling [Negative] : Heme/Lymph [FreeTextEntry9] : B/L Leg swelling L> R, L knee pain

## 2024-02-02 NOTE — ASSESSMENT
[FreeTextEntry1] :  A/P: 58 year old female here for her f/u medical weight loss appointment. PMHx is significant for class 3 obesity, HTN, GERD, metabolic syndrome, and risk factors for sleep apnea.   -Will work together with an interdisciplinary team to help achieve their weight loss goals.  -Will continue to work with Max to optimize her nutrition and to increase her protein intake as tolerated. Will work on this goal for the next 2 weeks and we will touch base about next steps.  AOM discussion: Bupropion: Caution - given her hx of HTN controlled on metoprolol (has also used in past with minimal effect) Topiramate - Concern for sedating effect given her hectic and long work day Phentermine - Would not recommend given her hx of HTN on metoprolol GLP-1 - Would be the best option given above. Will discuss in 2 weeks time  -Continue to increase physical activity as tolerated -Given B/L leg swelling - recommended compression stockings  F/U with RD as scheduled F/U with MD via phone in 2 weeks and in person in 4 weeks  Patient was seen by Dr. Fay on 2/1/24. Time spent with patient was greater than 30 minutes, including chart review, time spent with patient, and charting.

## 2024-02-16 ENCOUNTER — TRANSCRIPTION ENCOUNTER (OUTPATIENT)
Age: 59
End: 2024-02-16

## 2024-02-16 ENCOUNTER — NON-APPOINTMENT (OUTPATIENT)
Age: 59
End: 2024-02-16

## 2024-02-21 ENCOUNTER — TRANSCRIPTION ENCOUNTER (OUTPATIENT)
Age: 59
End: 2024-02-21

## 2024-02-22 ENCOUNTER — TRANSCRIPTION ENCOUNTER (OUTPATIENT)
Age: 59
End: 2024-02-22

## 2024-02-26 ENCOUNTER — APPOINTMENT (OUTPATIENT)
Dept: SURGERY | Facility: CLINIC | Age: 59
End: 2024-02-26
Payer: COMMERCIAL

## 2024-02-26 PROCEDURE — 97803 MED NUTRITION INDIV SUBSEQ: CPT | Mod: 95

## 2024-02-28 VITALS — HEIGHT: 65.5 IN | BODY MASS INDEX: 41.98 KG/M2 | WEIGHT: 255 LBS

## 2024-03-11 ENCOUNTER — APPOINTMENT (OUTPATIENT)
Dept: SURGERY | Facility: CLINIC | Age: 59
End: 2024-03-11
Payer: COMMERCIAL

## 2024-03-11 VITALS
HEIGHT: 65.5 IN | DIASTOLIC BLOOD PRESSURE: 88 MMHG | SYSTOLIC BLOOD PRESSURE: 118 MMHG | OXYGEN SATURATION: 96 % | TEMPERATURE: 97.3 F | WEIGHT: 256 LBS | BODY MASS INDEX: 42.14 KG/M2 | HEART RATE: 97 BPM

## 2024-03-11 DIAGNOSIS — Z12.11 ENCOUNTER FOR SCREENING FOR MALIGNANT NEOPLASM OF COLON: ICD-10-CM

## 2024-03-11 PROCEDURE — 99213 OFFICE O/P EST LOW 20 MIN: CPT

## 2024-03-11 PROCEDURE — G2211 COMPLEX E/M VISIT ADD ON: CPT

## 2024-03-11 NOTE — ASSESSMENT
[FreeTextEntry1] :  A/P: 58 year old female here for her f/u medical weight loss appointment. PMHx is significant for class 3 obesity, HTN, GERD, metabolic syndrome, and risk factors for sleep apnea.   -Will work together with an interdisciplinary team to help achieve their weight loss goals.  -Will continue to work with Max to optimize her nutrition and to increase her protein intake as tolerated.  -Continue working with the PATH program  -Continue Wegovy. Currently on the starter dose of 0.25mg weekly. Has only taken one dose. Take three more doses at 0,25 mg and increase to 0.5 mg after the 4th dose. Sent [facility] message that Wegovy is safe to store at room temperature for up to 28 days.    AOM discussion: Bupropion: Caution - given her hx of HTN controlled on metoprolol (has also used in past with minimal effect) Topiramate - Concern for sedating effect given her hectic and long work day Phentermine - Would not recommend given her hx of HTN on metoprolol  -Continue to increase physical activity as tolerated -Given B/L leg swelling - again recommended compression stockings -Risk factors for SUSANNE - recommended that she reach out to sleep medicine for a sleep study. She will work on trying to get a home evaluation.  -HTN: On metoprolol -GERD - manages with prn TUMS -Metabolic syndrome - continue to monitor with weight loss  F/U with RD as scheduled F/U with MD in 4 weeks  Patient was seen by Dr. Fay on 3/11/24. Time spent with patient was greater than 20 minutes, including chart review, time spent with patient, and charting.

## 2024-03-11 NOTE — HISTORY OF PRESENT ILLNESS
[de-identified] : Referred by: self-referred  HPI: 58 year old female here for her f/u medical weight loss appointment. PMHx is significant for class 3 obesity, HTN, GERD, metabolic syndrome, and risk factors for sleep apnea.   Weight History: Highest Weight:  265 lbs/44.1 kg/m2  Lowest Weight: 155 lbs (~22 yrs ago) Weight at [facility]: 265 lbs (Jan 2024) Current Weight: 256 lbs Weight change: -9lbs  Since we last met, she joined the [employer] program.  Was finally able to obtain the Wegovy.  Took once dose. Did not take her dose last week because she was traveling for work and thought that it needed to be kept refrigerated, Will take today.  Has a lot of stress at work, but is managing. 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   Has not been able to increase her physical activity as much as hoped.   Has not yet started to use compression stockings.   Previous Weight Loss Efforts: Has previously tried Saxenda in the past. Tried for several months with minimal effect. Does not recall the highest dose tried. Tolerated well with minimal side effects.  Previous use of AOMs: Has previously tried Saxenda in the past. Tried for several months with minimal effect. Does not recall the highest dose tried. Tolerated well with minimal side effects. Has also tried bupropion for tx of depression. Did not notice any weight change or change in appetite while on it. Has also tried WWs. Was able to lose ~50 lbs in the past, before becoming pregnant.  Has also tried Golo and IF. Tried Premiere protein shakes in the past --> constipation.   Medications that may have contributed to weight gain: metoprolol - has not noticed any effect on her weight since starting.   Eating behaviors: grazing emotional eating, especially when stressed Fast food (often eats dinner in her car, on the way home) +salt cravings No longer eating red meat  SocHx: Works 2 jobs at [employer] (6:30 am - 9:30 M-F and Saturday) Lives with adult son and nephew Former smoker  Sleep: Short sleep duration. Goes to bed ~11:30 and wakes up at 4:30 AM +Snores Occ snoring awakes her from sleep Does reports feeling rested on most days Sleeps more on weekends  Reproductive/Preventive Screenings: LMP 2018 UTD pap, mammo, and colonoscopy

## 2024-03-11 NOTE — PHYSICAL EXAM
[No Rash or Lesion] : No rash or lesion [Alert] : alert [Oriented to Person] : oriented to person [Oriented to Place] : oriented to place [Oriented to Time] : oriented to time [Calm] : calm [de-identified] : Well appearing female in NAD [de-identified] : CNs II-XII grossly intact [de-identified] : No increased WOB [de-identified] : Visible of B/L LEs most noticeable at ankles at junction with ankle socks

## 2024-03-25 ENCOUNTER — APPOINTMENT (OUTPATIENT)
Dept: SURGERY | Facility: CLINIC | Age: 59
End: 2024-03-25
Payer: COMMERCIAL

## 2024-03-25 PROCEDURE — 97803 MED NUTRITION INDIV SUBSEQ: CPT | Mod: 95

## 2024-03-27 VITALS — WEIGHT: 252 LBS | HEIGHT: 65.5 IN | BODY MASS INDEX: 41.48 KG/M2

## 2024-03-28 RX ORDER — SEMAGLUTIDE 0.5 MG/.5ML
0.5 INJECTION, SOLUTION SUBCUTANEOUS
Qty: 1 | Refills: 2 | Status: ACTIVE | COMMUNITY
Start: 2024-03-28 | End: 1900-01-01

## 2024-04-09 ENCOUNTER — APPOINTMENT (OUTPATIENT)
Dept: SURGERY | Facility: CLINIC | Age: 59
End: 2024-04-09
Payer: COMMERCIAL

## 2024-04-09 VITALS
HEIGHT: 65.5 IN | DIASTOLIC BLOOD PRESSURE: 70 MMHG | SYSTOLIC BLOOD PRESSURE: 124 MMHG | WEIGHT: 250 LBS | BODY MASS INDEX: 41.15 KG/M2

## 2024-04-09 PROCEDURE — G2211 COMPLEX E/M VISIT ADD ON: CPT

## 2024-04-09 PROCEDURE — 99214 OFFICE O/P EST MOD 30 MIN: CPT

## 2024-04-09 RX ORDER — NIFEDIPINE 30 MG/1
30 TABLET, FILM COATED, EXTENDED RELEASE ORAL
Refills: 0 | Status: ACTIVE | COMMUNITY

## 2024-04-09 RX ORDER — SEMAGLUTIDE 0.25 MG/.5ML
0.25 INJECTION, SOLUTION SUBCUTANEOUS
Qty: 3 | Refills: 1 | Status: DISCONTINUED | COMMUNITY
Start: 2024-02-16 | End: 2024-04-09

## 2024-04-09 RX ORDER — METOPROLOL TARTRATE 25 MG/1
25 TABLET, FILM COATED ORAL TWICE DAILY
Refills: 0 | Status: DISCONTINUED | COMMUNITY
End: 2024-04-09

## 2024-04-11 NOTE — REVIEW OF SYSTEMS
[As Noted in HPI] : as noted in HPI [Sleep Disturbances] : sleep disturbances [Negative] : Heme/Lymph

## 2024-04-12 NOTE — ASSESSMENT
[FreeTextEntry1] :  A/P: 58 year old female here for her f/u medical weight loss appointment. PMHx is significant for class 3 obesity, HTN, GERD, metabolic syndrome, and risk factors for sleep apnea.   -Continue to work together with an interdisciplinary team to help achieve their weight loss goals.  -Continue to work with Max to optimize her nutrition and to increase her protein intake as tolerated. -Continue Wegovy 0.5 mg SC weekly. Tolerating well. Will hold on increasing dose for now.   AOM discussion: Bupropion: Caution - given her hx of HTN controlled on metoprolol (has also used in past with minimal effect) Topiramate - Concern for sedating effect given her hectic and long work day Phentermine - Would not recommend given her hx of HTN on metoprolol GLP-1 - Would be the best option given above. Will discuss in 2 weeks time  -Continue to increase physical activity as tolerated. Goal: Sunday 20 min walk/day + Saturday 15 min walk/day -B/L leg swelling - Continue compression stockings -Risk factors for sleep apnea - Will work to schedule her sleep evaluation -HTN - recently changed from metoprolol to diltiazem. Now better controlled. Continue to monitor.  -GERD - prn TUMs. Continue to monitor on Wegovy -Metabolic Syndrome - continue to monitor with weight loss  F/U with RD as scheduled F/U with MD in 4 - 6 weeks  Patient was seen by Dr. Fay on 4/9/24. Time spent with patient was greater than 35 minutes, including chart review, time spent with patient, and charting.

## 2024-04-12 NOTE — HISTORY OF PRESENT ILLNESS
[de-identified] : Referred by: self-referred  HPI: 58 year old female here for her f/u medical weight loss appointment. PMHx is significant for class 3 obesity, HTN, GERD, metabolic syndrome, and risk factors for sleep apnea.   Weight History: Highest Weight:  265 lbs/44.1 kg/m2  Lowest Weight: 155 lbs (~22 yrs ago) Weight at [facility]: 265 lbs (Jan 2024) Current Weight: 250 lbs Weight change: -15 lbs TBWL: 5.6%  Since we last met, she continues on Wegovy and is now on her 2nd week at 0.5 mg.  Is tolerating well overall, although she does report increased reflux which she is managing with prn TUMs. Has noticed a decreased in her appetite and portion sizes, with decreased cravings for sweets and carbs. Is now having difficulty tolerating fried foods 2/2 to worsening of GERD. Does report that her clothes are "fitting great". Also reports increased energy and motivation.   Since we last met, he cardiologist changed her from metoprolol to procardia because metoprolol was not adequately controlling her BP and she was feeling unwell, +HAs, no CP. Was having BPs in the 180s/120s. Today has BP was 124/70.  Has not been able to increase her physical activity as much as she would like. However, she has become more regular with using her compression stockings --> decreased leg discomfort.   Has not yet been able to get the sleep study.   Previous Weight Loss Efforts: Has previously tried Saxenda in the past. Tried for several months with minimal effect. Does not recall the highest dose tried. Tolerated well with minimal side effects.  Previous use of AOMs: Has previously tried Saxenda in the past. Tried for several months with minimal effect. Does not recall the highest dose tried. Tolerated well with minimal side effects. Has also tried bupropion for tx of depression. Did not notice any weight change or change in appetite while on it. Has also tried WWs. Was able to lose ~50 lbs in the past, before becoming pregnant.  Has also tried Golo and IF. Tried Premiere protein shakes in the past --> constipation.   Medications that may have contributed to weight gain: metoprolol - has not noticed any effect on her weight since starting.   Eating behaviors: grazing emotional eating, especially when stressed Fast food (often eats dinner in her car, on the way home) +salt cravings No longer eating red meat  SocHx: Works 2 jobs at [employer] (6:30 am - 9:30 M-F and Saturday) Lives with adult son and nephew Former smoker  Sleep: Short sleep duration. Goes to bed ~11:30 and wakes up at 4:30 AM +Snores Occ snoring awakes her from sleep Does reports feeling rested on most days Sleeps more on weekends  Reproductive/Preventive Screenings: LMP 2018 UTD pap, mammo, and colonoscopy

## 2024-04-12 NOTE — PHYSICAL EXAM
[No Rash or Lesion] : No rash or lesion [Alert] : alert [Oriented to Person] : oriented to person [Oriented to Place] : oriented to place [Oriented to Time] : oriented to time [Calm] : calm [de-identified] : CNs II-XII grossly intact [de-identified] : Well appearing female in NAD [de-identified] : No increased WOB

## 2024-04-25 ENCOUNTER — APPOINTMENT (OUTPATIENT)
Dept: SURGERY | Facility: CLINIC | Age: 59
End: 2024-04-25
Payer: COMMERCIAL

## 2024-04-25 ENCOUNTER — APPOINTMENT (OUTPATIENT)
Dept: SURGERY | Facility: CLINIC | Age: 59
End: 2024-04-25

## 2024-04-25 VITALS — WEIGHT: 248 LBS | BODY MASS INDEX: 40.82 KG/M2 | HEIGHT: 65.5 IN

## 2024-04-25 PROCEDURE — 97803 MED NUTRITION INDIV SUBSEQ: CPT | Mod: 95

## 2024-05-17 ENCOUNTER — APPOINTMENT (OUTPATIENT)
Dept: SURGERY | Facility: CLINIC | Age: 59
End: 2024-05-17
Payer: COMMERCIAL

## 2024-05-17 VITALS
HEIGHT: 65.5 IN | SYSTOLIC BLOOD PRESSURE: 126 MMHG | WEIGHT: 251 LBS | BODY MASS INDEX: 41.32 KG/M2 | DIASTOLIC BLOOD PRESSURE: 80 MMHG

## 2024-05-17 DIAGNOSIS — I10 ESSENTIAL (PRIMARY) HYPERTENSION: ICD-10-CM

## 2024-05-17 DIAGNOSIS — E66.01 MORBID (SEVERE) OBESITY DUE TO EXCESS CALORIES: ICD-10-CM

## 2024-05-17 DIAGNOSIS — Z91.89 OTHER SPECIFIED PERSONAL RISK FACTORS, NOT ELSEWHERE CLASSIFIED: ICD-10-CM

## 2024-05-17 DIAGNOSIS — E55.9 VITAMIN D DEFICIENCY, UNSPECIFIED: ICD-10-CM

## 2024-05-17 DIAGNOSIS — K21.9 GASTRO-ESOPHAGEAL REFLUX DISEASE W/OUT ESOPHAGITIS: ICD-10-CM

## 2024-05-17 DIAGNOSIS — E78.5 HYPERLIPIDEMIA, UNSPECIFIED: ICD-10-CM

## 2024-05-17 DIAGNOSIS — E88.810 METABOLIC SYNDROME: ICD-10-CM

## 2024-05-17 PROCEDURE — 99214 OFFICE O/P EST MOD 30 MIN: CPT

## 2024-05-17 PROCEDURE — G2211 COMPLEX E/M VISIT ADD ON: CPT

## 2024-05-17 RX ORDER — SEMAGLUTIDE 1 MG/.5ML
1 INJECTION, SOLUTION SUBCUTANEOUS
Qty: 1 | Refills: 2 | Status: ACTIVE | COMMUNITY
Start: 2024-05-17 | End: 1900-01-01

## 2024-05-20 NOTE — ASSESSMENT
[FreeTextEntry1] :  A/P: 58 year old female here for her f/u medical weight loss appointment. PMHx is significant for class 3 obesity, HTN, GERD, metabolic syndrome, and risk factors for sleep apnea.   -Continue to work together with an interdisciplinary team to help achieve their weight loss goals.  -Continue to work with Max to optimize her nutrition and to increase her protein intake as tolerated. -Will increase her dose of Wegovy from 0.5 mg to 1 mg SC weekly. Sent new Rx today.    AOM discussion: Bupropion: Caution - given her hx of HTN controlled on metoprolol (has also used in past with minimal effect) Topiramate - Concern for sedating effect given her hectic and long work day Phentermine - Would not recommend given her hx of HTN on metoprolol GLP-1 - Would be the best option given above. Will discuss in 2 weeks time  -Continue to increase physical activity as tolerated. Goal: Sunday 20 min walk/day + Saturday 15 min walk/day -B/L leg swelling - Continue compression stockings -Risk factors for sleep apnea - Will work to schedule her sleep evaluation -HTN - recently changed from metoprolol to diltiazem. Now better controlled. Continue to monitor.  -GERD - prn TUMs. Continue to monitor on Wegovy -Metabolic Syndrome - continue to monitor with weight loss High levels of stress - encouraged her to consider taking some time off  F/U with RD as scheduled F/U with MD in 4 - 6 weeks  Patient was seen by Dr. Fay on 4/9/24. Time spent with patient was greater than 35 minutes, including chart review, time spent with patient, and charting.

## 2024-05-20 NOTE — HISTORY OF PRESENT ILLNESS
[de-identified] : Referred by: self-referred  HPI: 58 year old female here for her f/u medical weight loss appointment. PMHx is significant for class 3 obesity, HTN, GERD, metabolic syndrome, and risk factors for sleep apnea.   Weight History: Highest Weight:  265 lbs/44.1 kg/m2  Lowest Weight: 155 lbs (~22 yrs ago) Weight at [facility]: 265 lbs (Jan 2024) Current Weight: 251 lbs Weight change: -14 lbs TBWL: 5.3%  Since we last met, she continues on Wegovy 0.5 mg weekly. Has been helpful, but admits that due to recent family events, she has not been able to focus on her weight loss efoorts. Her cousin recently had bariatric surgery, aspirated and was in the ICU several days on a ventilator. She slept at the hospital for days.   Has not yet been able to get the sleep study.   Previous Weight Loss Efforts: Has previously tried Saxenda in the past. Tried for several months with minimal effect. Does not recall the highest dose tried. Tolerated well with minimal side effects.  Previous use of AOMs: Has previously tried Saxenda in the past. Tried for several months with minimal effect. Does not recall the highest dose tried. Tolerated well with minimal side effects. Has also tried bupropion for tx of depression. Did not notice any weight change or change in appetite while on it. Has also tried WWs. Was able to lose ~50 lbs in the past, before becoming pregnant.  Has also tried Golo and IF. Tried Premiere protein shakes in the past --> constipation.   Medications that may have contributed to weight gain: metoprolol - has not noticed any effect on her weight since starting.   Eating behaviors: grazing emotional eating, especially when stressed Fast food (often eats dinner in her car, on the way home) +salt cravings No longer eating red meat  SocHx: Works 2 jobs at [employer] (6:30 am - 9:30 M-F and Saturday) Lives with adult son and nephew Former smoker  Sleep: Short sleep duration. Goes to bed ~11:30 and wakes up at 4:30 AM +Snores Occ snoring awakes her from sleep Does reports feeling rested on most days Sleeps more on weekends  Reproductive/Preventive Screenings: LMP 2018 UTD pap, mammo, and colonoscopy

## 2024-05-20 NOTE — PHYSICAL EXAM
[No Rash or Lesion] : No rash or lesion [Alert] : alert [Oriented to Person] : oriented to person [Oriented to Place] : oriented to place [Oriented to Time] : oriented to time [Calm] : calm [de-identified] : Well appearing female in NAD [de-identified] : CNs II-XII grossly intact [de-identified] : No increased WOB

## 2024-05-30 LAB
ALBUMIN SERPL ELPH-MCNC: 4.7 G/DL
ALP BLD-CCNC: 68 U/L
ALT SERPL-CCNC: 27 U/L
ANION GAP SERPL CALC-SCNC: 16 MMOL/L
AST SERPL-CCNC: 23 U/L
BILIRUB SERPL-MCNC: 0.4 MG/DL
BUN SERPL-MCNC: 21 MG/DL
CALCIUM SERPL-MCNC: 10.1 MG/DL
CHLORIDE SERPL-SCNC: 102 MMOL/L
CHOLEST SERPL-MCNC: 233 MG/DL
CO2 SERPL-SCNC: 22 MMOL/L
CREAT SERPL-MCNC: 0.9 MG/DL
EGFR: 74 ML/MIN/1.73M2
GLUCOSE SERPL-MCNC: 101 MG/DL
HCT VFR BLD CALC: 44.5 %
HDLC SERPL-MCNC: 63 MG/DL
HGB BLD-MCNC: 14.2 G/DL
LDLC SERPL CALC-MCNC: 147 MG/DL
MCHC RBC-ENTMCNC: 28.4 PG
MCHC RBC-ENTMCNC: 31.9 G/DL
MCV RBC AUTO: 89 FL
NONHDLC SERPL-MCNC: 170 MG/DL
PLATELET # BLD AUTO: 244 K/UL
PMV BLD: 13.4 FL
POTASSIUM SERPL-SCNC: 5.1 MMOL/L
PROT SERPL-MCNC: 7.4 G/DL
RBC # BLD: 5 M/UL
RBC # FLD: 13.6 %
SODIUM SERPL-SCNC: 140 MMOL/L
TRIGL SERPL-MCNC: 116 MG/DL
VIT B12 SERPL-MCNC: 514 PG/ML
WBC # FLD AUTO: 7.93 K/UL

## 2024-06-24 ENCOUNTER — APPOINTMENT (OUTPATIENT)
Dept: SURGERY | Facility: CLINIC | Age: 59
End: 2024-06-24

## 2024-08-02 ENCOUNTER — APPOINTMENT (OUTPATIENT)
Dept: SURGERY | Facility: CLINIC | Age: 59
End: 2024-08-02
Payer: COMMERCIAL

## 2024-08-02 VITALS
WEIGHT: 245 LBS | HEIGHT: 65.5 IN | OXYGEN SATURATION: 99 % | HEART RATE: 99 BPM | DIASTOLIC BLOOD PRESSURE: 84 MMHG | SYSTOLIC BLOOD PRESSURE: 110 MMHG | BODY MASS INDEX: 40.33 KG/M2

## 2024-08-02 DIAGNOSIS — E88.810 METABOLIC SYNDROME: ICD-10-CM

## 2024-08-02 DIAGNOSIS — E66.01 MORBID (SEVERE) OBESITY DUE TO EXCESS CALORIES: ICD-10-CM

## 2024-08-02 DIAGNOSIS — K21.9 GASTRO-ESOPHAGEAL REFLUX DISEASE W/OUT ESOPHAGITIS: ICD-10-CM

## 2024-08-02 DIAGNOSIS — Z91.89 OTHER SPECIFIED PERSONAL RISK FACTORS, NOT ELSEWHERE CLASSIFIED: ICD-10-CM

## 2024-08-02 DIAGNOSIS — E78.5 HYPERLIPIDEMIA, UNSPECIFIED: ICD-10-CM

## 2024-08-02 DIAGNOSIS — I10 ESSENTIAL (PRIMARY) HYPERTENSION: ICD-10-CM

## 2024-08-02 PROCEDURE — G2211 COMPLEX E/M VISIT ADD ON: CPT

## 2024-08-02 PROCEDURE — 99214 OFFICE O/P EST MOD 30 MIN: CPT

## 2024-08-02 NOTE — DATA REVIEWED
[No studies available for review at this time.] : No studies available for review at this time.
no abdominal pain, no bloating, no constipation, no diarrhea, no nausea and no vomiting.

## 2024-08-02 NOTE — PHYSICAL EXAM
[No Rash or Lesion] : No rash or lesion [Alert] : alert [Oriented to Person] : oriented to person [Oriented to Place] : oriented to place [Oriented to Time] : oriented to time [Calm] : calm [de-identified] :  Well appearing female in NAD [de-identified] :  CNs II-XII grossly intact [de-identified] :  No increased WOB

## 2024-08-02 NOTE — HISTORY OF PRESENT ILLNESS
[de-identified] : Referred by: self-referred  HPI: 59 year old female here for her f/u medical weight loss appointment. PMHx is significant for class 3 obesity, HTN, GERD, metabolic syndrome, and risk factors for sleep apnea.   Weight History: Highest Weight:  265 lbs/44.1 kg/m2  Lowest Weight: 155 lbs (~22 yrs ago) Weight at [facility]: 265 lbs (Jan 2024) Current Weight: 245 lbs Weight change: -20 lbs TBWL: 7.5%  Since we last met, she continues on Wegovy 1 mg weekly. Continues to be helpful and has not experienced any GI side effects. Weight has been stable for several weeks.   Is sleeping better, but has not been able to increase her physical activity as hoped.   Has done some meal prep on Sundays, but has not been able to be routine due to significant work demands.  Has not yet been able to get the sleep study.   Previous Weight Loss Efforts: Has previously tried Saxenda in the past. Tried for several months with minimal effect. Does not recall the highest dose tried. Tolerated well with minimal side effects.  Previous use of AOMs: Has previously tried Saxenda in the past. Tried for several months with minimal effect. Does not recall the highest dose tried. Tolerated well with minimal side effects. Has also tried bupropion for tx of depression. Did not notice any weight change or change in appetite while on it. Has also tried WWs. Was able to lose ~50 lbs in the past, before becoming pregnant.  Has also tried Golo and IF. Tried Premiere protein shakes in the past --> constipation.   Medications that may have contributed to weight gain: metoprolol - has not noticed any effect on her weight since starting.   Eating behaviors: grazing emotional eating, especially when stressed Fast food (often eats dinner in her car, on the way home) +salt cravings No longer eating red meat  SocHx: Works 2 jobs at [employer] (6:30 am - 9:30 M-F and Saturday) Lives with adult son and nephew Former smoker  Sleep: Short sleep duration. Goes to bed ~11:30 and wakes up at 4:30 AM +Snores Occ snoring awakes her from sleep Does reports feeling rested on most days Sleeps more on weekends  Reproductive/Preventive Screenings: LMP 2018 UTD pap, mammo, and colonoscopy

## 2024-08-02 NOTE — ASSESSMENT
[FreeTextEntry1] :  A/P: 59 year old female here for her f/u medical weight loss appointment. PMHx is significant for class 3 obesity, HTN, GERD, metabolic syndrome, and risk factors for sleep apnea.   -Continue to work together with an interdisciplinary team to help achieve their weight loss goals.  -Continue to work with Max to optimize her nutrition and to increase her protein intake as tolerated. -Will increase her dose of Wegovy from 1 mg SC weekly to 1.7 mg. Sent new Rx today.    AOM discussion: Bupropion: Caution - given her hx of HTN controlled on metoprolol (has also used in past with minimal effect) Topiramate - Concern for sedating effect given her hectic and long work day Phentermine - Would not recommend given her hx of HTN on metoprolol GLP-1 - Would be the best option given above. Will discuss in 2 weeks time  -Strongly encouraged her to continue to increase physical activity and resistance work as tolerated.  -B/L leg swelling - Continue compression stockings -Risk factors for sleep apnea - Will work to schedule her sleep evaluation - reminded today.  -HTN - recently changed from metoprolol to diltiazem. Now better controlled. Continue to monitor.  -GERD - prn TUMs. Continue to monitor on Wegovy -Metabolic Syndrome - continue to monitor with weight loss High levels of stress - continue to work on achieving some work life balance  F/U with RD as scheduled F/U with MD in October  Patient was seen by Dr. Fay on 8/2/24. Time spent with patient was greater than 35 minutes, including chart review, time spent with patient, and charting.

## 2024-08-28 ENCOUNTER — OUTPATIENT (OUTPATIENT)
Dept: OUTPATIENT SERVICES | Facility: HOSPITAL | Age: 59
LOS: 1 days | End: 2024-08-28
Payer: COMMERCIAL

## 2024-08-28 DIAGNOSIS — Z12.31 ENCOUNTER FOR SCREENING MAMMOGRAM FOR MALIGNANT NEOPLASM OF BREAST: ICD-10-CM

## 2024-08-28 PROCEDURE — 77067 SCR MAMMO BI INCL CAD: CPT

## 2024-08-28 PROCEDURE — 77067 SCR MAMMO BI INCL CAD: CPT | Mod: 26

## 2024-08-28 PROCEDURE — 77063 BREAST TOMOSYNTHESIS BI: CPT

## 2024-08-28 PROCEDURE — 77063 BREAST TOMOSYNTHESIS BI: CPT | Mod: 26

## 2024-08-29 DIAGNOSIS — Z12.31 ENCOUNTER FOR SCREENING MAMMOGRAM FOR MALIGNANT NEOPLASM OF BREAST: ICD-10-CM

## 2024-10-02 ENCOUNTER — APPOINTMENT (OUTPATIENT)
Dept: SURGERY | Facility: CLINIC | Age: 59
End: 2024-10-02
Payer: COMMERCIAL

## 2024-10-02 VITALS
DIASTOLIC BLOOD PRESSURE: 80 MMHG | WEIGHT: 243 LBS | HEIGHT: 65.5 IN | BODY MASS INDEX: 40 KG/M2 | OXYGEN SATURATION: 97 % | SYSTOLIC BLOOD PRESSURE: 124 MMHG | HEART RATE: 91 BPM

## 2024-10-02 DIAGNOSIS — I10 ESSENTIAL (PRIMARY) HYPERTENSION: ICD-10-CM

## 2024-10-02 DIAGNOSIS — F32.1 MAJOR DEPRESSIVE DISORDER, SINGLE EPISODE, MODERATE: ICD-10-CM

## 2024-10-02 DIAGNOSIS — E78.5 HYPERLIPIDEMIA, UNSPECIFIED: ICD-10-CM

## 2024-10-02 DIAGNOSIS — E66.01 MORBID (SEVERE) OBESITY DUE TO EXCESS CALORIES: ICD-10-CM

## 2024-10-02 DIAGNOSIS — E88.810 METABOLIC SYNDROME: ICD-10-CM

## 2024-10-02 DIAGNOSIS — F32.A DEPRESSION, UNSPECIFIED: ICD-10-CM

## 2024-10-02 DIAGNOSIS — K21.9 GASTRO-ESOPHAGEAL REFLUX DISEASE W/OUT ESOPHAGITIS: ICD-10-CM

## 2024-10-02 DIAGNOSIS — E55.9 VITAMIN D DEFICIENCY, UNSPECIFIED: ICD-10-CM

## 2024-10-02 DIAGNOSIS — R11.0 NAUSEA: ICD-10-CM

## 2024-10-02 PROCEDURE — 99214 OFFICE O/P EST MOD 30 MIN: CPT

## 2024-10-02 PROCEDURE — G2211 COMPLEX E/M VISIT ADD ON: CPT

## 2024-10-02 RX ORDER — TIRZEPATIDE 10 MG/.5ML
10 INJECTION, SOLUTION SUBCUTANEOUS
Qty: 1 | Refills: 2 | Status: ACTIVE | COMMUNITY
Start: 2024-10-02 | End: 1900-01-01

## 2024-10-02 NOTE — HISTORY OF PRESENT ILLNESS
[de-identified] : Referred by: self-referred  HPI: 59 year old female here for her f/u medical weight loss appointment. PMHx is significant for class 3 obesity, HTN, GERD, metabolic syndrome, and risk factors for sleep apnea.   Weight History: Highest Weight:  265 lbs/44.1 kg/m2  Lowest Weight: 155 lbs (~22 yrs ago) Weight at [facility]: 265 lbs (Jan 2024) Current Weight: 243 lbs Weight change: -23 lbs % TBWL: 9.5%  Since we last met, she continues on Wegovy 1.7 mg weekly. Continues to be helpful and has not experienced any GI side effects. Overall, weight has been stable for several weeks +/- 1-2 lbs.   Continues to report that she is sleeping better.   Has not been able to increase her physical activity as hoped. Has joined a gym and is planning to start next week.  Previous Weight Loss Efforts: Has previously tried Saxenda in the past. Tried for several months with minimal effect. Does not recall the highest dose tried. Tolerated well with minimal side effects.  Previous use of AOMs: Has previously tried Saxenda in the past. Tried for several months with minimal effect. Does not recall the highest dose tried. Tolerated well with minimal side effects. Has also tried bupropion for tx of depression. Did not notice any weight change or change in appetite while on it. Has also tried WWs. Was able to lose ~50 lbs in the past, before becoming pregnant.  Has also tried Golo and IF. Tried Premiere protein shakes in the past --> constipation.   Medications that may have contributed to weight gain: metoprolol - has not noticed any effect on her weight since starting.   Eating behaviors: grazing emotional eating, especially when stressed Fast food (often eats dinner in her car, on the way home) +salt cravings No longer eating red meat  SocHx: Works 2 jobs at [employer] (6:30 am - 9:30 M-F and Saturday) Lives with adult son and nephew Former smoker  Sleep: Short sleep duration. Goes to bed ~11:30 and wakes up at 4:30 AM +Snores Occ snoring awakes her from sleep Does reports feeling rested on most days Sleeps more on weekends  Reproductive/Preventive Screenings: LMP 2018 UTD pap, mammo, and colonoscopy

## 2024-10-02 NOTE — ASSESSMENT
[FreeTextEntry1] :  A/P: 59 year old female here for her f/u medical weight loss appointment. PMHx is significant for class 3 obesity, HTN, GERD, metabolic syndrome, and risk factors for sleep apnea.   -Continue to work together with an interdisciplinary team to help achieve their weight loss goals.  -Continue to work with Max to optimize her nutrition and to increase her protein intake as tolerated. -Has had minimal weight loss since May. Will increase her dose of Wegovy from 1.7 mg SC weekly to 2.4 mg. Sent new Rx today. If continues to have minimal weight loss, despite increased dose, will plan to change to Zepbound 12.5 at or before next visit.    AOM discussion: Bupropion: Caution - given her hx of HTN controlled on metoprolol (has also used in past with minimal effect) Topiramate - Concern for sedating effect given her hectic and long work day Phentermine - Would not recommend given her hx of HTN on metoprolol  -Congratulated her on her plan to start going to the gym next Monday. Continue to increase physical activity and resistance work as tolerated.  Due to the muscle wasting effect of GLP-1 Carin. Strongly encouraged that she start resistance/weight training to help maintain and mitigate the muscle wasting effect of this class of medication. -B/L leg swelling - Continue compression stockings -Risk factors for sleep apnea - Will work to schedule her sleep evaluation - Will revisit at upcoming visit. Is now sleeping better.  -HTN - recently changed from metoprolol to diltiazem. Now better controlled. Continue to monitor.  -GERD - prn TUMs. Continue to monitor on Wegovy -Metabolic Syndrome - continue to monitor with weight loss High levels of stress - continue to work on achieving some work life balance  F/U with RD as scheduled F/U with MD in December  Patient was seen by Dr. Fay on 10/2/24. Time spent with patient was greater than 35 minutes, including chart review, time spent with patient, and charting.

## 2024-10-02 NOTE — PHYSICAL EXAM
[No Rash or Lesion] : No rash or lesion [Alert] : alert [Oriented to Person] : oriented to person [Oriented to Place] : oriented to place [Oriented to Time] : oriented to time [Calm] : calm [de-identified] :  Well appearing female in NAD [de-identified] :  CNs II-XII grossly intact [de-identified] :  No increased WOB

## 2024-10-02 NOTE — ADDENDUM
[FreeTextEntry1] : Because a new PA was necessary to increase dose to 2 mg, will try to get a new PA for Zepbound given minimal response to escalation in therapy x ~5 months.

## 2024-10-03 ENCOUNTER — NON-APPOINTMENT (OUTPATIENT)
Age: 59
End: 2024-10-03

## 2024-10-03 ENCOUNTER — APPOINTMENT (OUTPATIENT)
Dept: SURGERY | Facility: CLINIC | Age: 59
End: 2024-10-03
Payer: COMMERCIAL

## 2024-10-03 PROBLEM — R11.0 NAUSEA: Status: ACTIVE | Noted: 2024-10-03

## 2024-10-03 PROCEDURE — 99212 OFFICE O/P EST SF 10 MIN: CPT

## 2024-10-03 RX ORDER — ONDANSETRON 8 MG/1
8 TABLET, ORALLY DISINTEGRATING ORAL
Qty: 30 | Refills: 1 | Status: ACTIVE | COMMUNITY
Start: 2024-10-03 | End: 1900-01-01

## 2024-10-03 NOTE — PHYSICAL EXAM
[Alert] : alert [Oriented to Person] : oriented to person [Oriented to Place] : oriented to place [Oriented to Time] : oriented to time [Calm] : calm [de-identified] : well appearing female in NAD

## 2024-10-03 NOTE — HISTORY OF PRESENT ILLNESS
[de-identified] : Ms. MILAD GARCIA is here to discuss weight loss surgery options.  Patient has had long standing problem trying to manage their weight without longterm success.  In trying to lose weight, they have tried numerous diet and exercise programs.  She has been using wegovy with some success.  She is interested in learning about weight loss surgery options.

## 2024-10-03 NOTE — PLAN
[FreeTextEntry1] : 1. Keep appointments with Dr. Stew Santiago 2, Use protein supplements to keep protein intake between 60-70 gms daily 3. add strength training to current exercise regimen

## 2024-10-03 NOTE — REASON FOR VISIT
Lesion Type: Cyst Method: 15 blade Suture Text: The incision was partially closed with Epidermal Closure: simple interrupted Curette: Yes Curette Text (Optional): After the contents were expressed a curette was used to partially remove the cyst wall. Include Sutures?: No Wound Care: Bacitracin [Consultation] : a consultation visit Dressing: dry sterile dressing [FreeTextEntry1] : Patient here to discuss weight loss surgery options Size Of Lesion In Cm (Optional But May Be Required For Some Insurances): 0 Preparation Text: The area was prepped in the usual clean fashion. Post-Care Instructions: I reviewed with the patient in detail post-care instructions. Patient should keep wound covered and call the office should any redness, pain, swelling or worsening occur. Anesthesia Type: 2% lidocaine with epinephrine Anesthesia Volume In Cc: 1 Epidermal Sutures: 4-0 Ethilon Detail Level: Simple Consent was obtained and risks were reviewed including but not limited to delayed wound healing, infection, need for multiple I and D's, and pain.

## 2024-12-05 ENCOUNTER — APPOINTMENT (OUTPATIENT)
Dept: SURGERY | Facility: CLINIC | Age: 59
End: 2024-12-05
Payer: COMMERCIAL

## 2024-12-05 VITALS
TEMPERATURE: 97 F | OXYGEN SATURATION: 97 % | BODY MASS INDEX: 37.86 KG/M2 | SYSTOLIC BLOOD PRESSURE: 118 MMHG | HEART RATE: 83 BPM | WEIGHT: 230 LBS | DIASTOLIC BLOOD PRESSURE: 80 MMHG | HEIGHT: 65.5 IN

## 2024-12-05 DIAGNOSIS — E66.01 MORBID (SEVERE) OBESITY DUE TO EXCESS CALORIES: ICD-10-CM

## 2024-12-05 DIAGNOSIS — E66.812 OBESITY, CLASS 2: ICD-10-CM

## 2024-12-05 DIAGNOSIS — I10 ESSENTIAL (PRIMARY) HYPERTENSION: ICD-10-CM

## 2024-12-05 DIAGNOSIS — E88.810 METABOLIC SYNDROME: ICD-10-CM

## 2024-12-05 DIAGNOSIS — Z91.89 OTHER SPECIFIED PERSONAL RISK FACTORS, NOT ELSEWHERE CLASSIFIED: ICD-10-CM

## 2024-12-05 DIAGNOSIS — F32.A DEPRESSION, UNSPECIFIED: ICD-10-CM

## 2024-12-05 DIAGNOSIS — K21.9 GASTRO-ESOPHAGEAL REFLUX DISEASE W/OUT ESOPHAGITIS: ICD-10-CM

## 2024-12-05 DIAGNOSIS — E78.5 HYPERLIPIDEMIA, UNSPECIFIED: ICD-10-CM

## 2024-12-05 DIAGNOSIS — E55.9 VITAMIN D DEFICIENCY, UNSPECIFIED: ICD-10-CM

## 2024-12-05 PROCEDURE — G2211 COMPLEX E/M VISIT ADD ON: CPT

## 2024-12-05 PROCEDURE — 99214 OFFICE O/P EST MOD 30 MIN: CPT

## 2024-12-05 RX ORDER — ERGOCALCIFEROL 1.25 MG/1
1.25 MG CAPSULE, LIQUID FILLED ORAL
Qty: 8 | Refills: 1 | Status: ACTIVE | COMMUNITY
Start: 2024-12-05 | End: 1900-01-01

## 2024-12-18 ENCOUNTER — TRANSCRIPTION ENCOUNTER (OUTPATIENT)
Age: 59
End: 2024-12-18

## 2025-02-03 ENCOUNTER — APPOINTMENT (OUTPATIENT)
Dept: SURGERY | Facility: CLINIC | Age: 60
End: 2025-02-03
Payer: COMMERCIAL

## 2025-02-03 PROCEDURE — 97803 MED NUTRITION INDIV SUBSEQ: CPT | Mod: 95

## 2025-02-05 VITALS — WEIGHT: 223 LBS | BODY MASS INDEX: 36.71 KG/M2 | HEIGHT: 65.5 IN

## 2025-02-14 ENCOUNTER — TRANSCRIPTION ENCOUNTER (OUTPATIENT)
Age: 60
End: 2025-02-14

## 2025-02-21 ENCOUNTER — APPOINTMENT (OUTPATIENT)
Dept: SURGERY | Facility: CLINIC | Age: 60
End: 2025-02-21
Payer: COMMERCIAL

## 2025-02-21 VITALS
HEIGHT: 65.5 IN | OXYGEN SATURATION: 98 % | WEIGHT: 220 LBS | DIASTOLIC BLOOD PRESSURE: 76 MMHG | SYSTOLIC BLOOD PRESSURE: 124 MMHG | HEART RATE: 80 BPM | TEMPERATURE: 97 F | BODY MASS INDEX: 36.21 KG/M2

## 2025-02-21 DIAGNOSIS — I10 ESSENTIAL (PRIMARY) HYPERTENSION: ICD-10-CM

## 2025-02-21 DIAGNOSIS — Z91.89 OTHER SPECIFIED PERSONAL RISK FACTORS, NOT ELSEWHERE CLASSIFIED: ICD-10-CM

## 2025-02-21 DIAGNOSIS — E66.812 OBESITY, CLASS 2: ICD-10-CM

## 2025-02-21 DIAGNOSIS — K21.9 GASTRO-ESOPHAGEAL REFLUX DISEASE W/OUT ESOPHAGITIS: ICD-10-CM

## 2025-02-21 DIAGNOSIS — F32.A DEPRESSION, UNSPECIFIED: ICD-10-CM

## 2025-02-21 DIAGNOSIS — E88.810 METABOLIC SYNDROME: ICD-10-CM

## 2025-02-21 DIAGNOSIS — E78.5 HYPERLIPIDEMIA, UNSPECIFIED: ICD-10-CM

## 2025-02-21 LAB
HCT VFR BLD CALC: 37.7 %
HGB BLD-MCNC: 12.2 G/DL
MCHC RBC-ENTMCNC: 27.1 PG
MCHC RBC-ENTMCNC: 32.4 G/DL
MCV RBC AUTO: 83.8 FL
PLATELET # BLD AUTO: 233 K/UL
PMV BLD AUTO: 0 /100 WBCS
PMV BLD: 12.4 FL
RBC # BLD: 4.5 M/UL
RBC # FLD: 14.5 %
WBC # FLD AUTO: 8.52 K/UL

## 2025-02-21 PROCEDURE — G2211 COMPLEX E/M VISIT ADD ON: CPT

## 2025-02-21 PROCEDURE — 99214 OFFICE O/P EST MOD 30 MIN: CPT

## 2025-02-24 LAB
25(OH)D3 SERPL-MCNC: 33 NG/ML
ALBUMIN SERPL ELPH-MCNC: 4.3 G/DL
ALP BLD-CCNC: 64 U/L
ALT SERPL-CCNC: 19 U/L
ANION GAP SERPL CALC-SCNC: 12 MMOL/L
AST SERPL-CCNC: 22 U/L
BILIRUB SERPL-MCNC: 0.4 MG/DL
BUN SERPL-MCNC: 23 MG/DL
CALCIUM SERPL-MCNC: 9.9 MG/DL
CHLORIDE SERPL-SCNC: 107 MMOL/L
CHOLEST SERPL-MCNC: 205 MG/DL
CO2 SERPL-SCNC: 22 MMOL/L
CREAT SERPL-MCNC: 0.9 MG/DL
EGFR: 74 ML/MIN/1.73M2
ESTIMATED AVERAGE GLUCOSE: 97 MG/DL
GLUCOSE SERPL-MCNC: 89 MG/DL
HBA1C MFR BLD HPLC: 5 %
HDLC SERPL-MCNC: 60 MG/DL
LDLC SERPL CALC-MCNC: 130 MG/DL
NONHDLC SERPL-MCNC: 145 MG/DL
POTASSIUM SERPL-SCNC: 5.3 MMOL/L
PROT SERPL-MCNC: 6.9 G/DL
SODIUM SERPL-SCNC: 141 MMOL/L
TRIGL SERPL-MCNC: 83 MG/DL
VIT B12 SERPL-MCNC: 641 PG/ML

## 2025-05-16 ENCOUNTER — APPOINTMENT (OUTPATIENT)
Dept: SURGERY | Facility: CLINIC | Age: 60
End: 2025-05-16
Payer: COMMERCIAL

## 2025-05-16 VITALS
DIASTOLIC BLOOD PRESSURE: 84 MMHG | BODY MASS INDEX: 35.72 KG/M2 | WEIGHT: 217 LBS | OXYGEN SATURATION: 95 % | SYSTOLIC BLOOD PRESSURE: 110 MMHG | HEART RATE: 97 BPM | HEIGHT: 65.5 IN

## 2025-05-16 DIAGNOSIS — F32.A DEPRESSION, UNSPECIFIED: ICD-10-CM

## 2025-05-16 DIAGNOSIS — E78.5 HYPERLIPIDEMIA, UNSPECIFIED: ICD-10-CM

## 2025-05-16 DIAGNOSIS — E66.812 OBESITY, CLASS 2: ICD-10-CM

## 2025-05-16 DIAGNOSIS — E55.9 VITAMIN D DEFICIENCY, UNSPECIFIED: ICD-10-CM

## 2025-05-16 DIAGNOSIS — I10 ESSENTIAL (PRIMARY) HYPERTENSION: ICD-10-CM

## 2025-05-16 DIAGNOSIS — K21.9 GASTRO-ESOPHAGEAL REFLUX DISEASE W/OUT ESOPHAGITIS: ICD-10-CM

## 2025-05-16 DIAGNOSIS — E88.810 METABOLIC SYNDROME: ICD-10-CM

## 2025-05-16 PROCEDURE — G2211 COMPLEX E/M VISIT ADD ON: CPT

## 2025-05-16 PROCEDURE — 99213 OFFICE O/P EST LOW 20 MIN: CPT

## 2025-05-21 ENCOUNTER — NON-APPOINTMENT (OUTPATIENT)
Age: 60
End: 2025-05-21

## 2025-05-23 ENCOUNTER — NON-APPOINTMENT (OUTPATIENT)
Age: 60
End: 2025-05-23

## 2025-08-15 ENCOUNTER — APPOINTMENT (OUTPATIENT)
Dept: SURGERY | Facility: CLINIC | Age: 60
End: 2025-08-15
Payer: COMMERCIAL

## 2025-08-15 VITALS
DIASTOLIC BLOOD PRESSURE: 78 MMHG | HEART RATE: 113 BPM | WEIGHT: 208 LBS | HEIGHT: 65.5 IN | TEMPERATURE: 97 F | BODY MASS INDEX: 34.24 KG/M2 | SYSTOLIC BLOOD PRESSURE: 126 MMHG | OXYGEN SATURATION: 98 %

## 2025-08-15 DIAGNOSIS — K21.9 GASTRO-ESOPHAGEAL REFLUX DISEASE W/OUT ESOPHAGITIS: ICD-10-CM

## 2025-08-15 DIAGNOSIS — E66.811 OBESITY, CLASS 1: ICD-10-CM

## 2025-08-15 DIAGNOSIS — F32.A DEPRESSION, UNSPECIFIED: ICD-10-CM

## 2025-08-15 DIAGNOSIS — E55.9 VITAMIN D DEFICIENCY, UNSPECIFIED: ICD-10-CM

## 2025-08-15 DIAGNOSIS — E88.810 METABOLIC SYNDROME: ICD-10-CM

## 2025-08-15 DIAGNOSIS — I10 ESSENTIAL (PRIMARY) HYPERTENSION: ICD-10-CM

## 2025-08-15 DIAGNOSIS — E66.812 OBESITY, CLASS 2: ICD-10-CM

## 2025-08-15 DIAGNOSIS — E78.5 HYPERLIPIDEMIA, UNSPECIFIED: ICD-10-CM

## 2025-08-15 PROCEDURE — 99213 OFFICE O/P EST LOW 20 MIN: CPT

## 2025-08-15 PROCEDURE — G2211 COMPLEX E/M VISIT ADD ON: CPT

## 2025-08-20 ENCOUNTER — RESULT REVIEW (OUTPATIENT)
Age: 60
End: 2025-08-20

## 2025-08-20 ENCOUNTER — OUTPATIENT (OUTPATIENT)
Dept: OUTPATIENT SERVICES | Facility: HOSPITAL | Age: 60
LOS: 1 days | End: 2025-08-20
Payer: COMMERCIAL

## 2025-08-20 DIAGNOSIS — Z00.8 ENCOUNTER FOR OTHER GENERAL EXAMINATION: ICD-10-CM

## 2025-08-20 DIAGNOSIS — I25.10 ATHEROSCLEROTIC HEART DISEASE OF NATIVE CORONARY ARTERY WITHOUT ANGINA PECTORIS: ICD-10-CM

## 2025-08-20 PROCEDURE — 75574 CT ANGIO HRT W/3D IMAGE: CPT

## 2025-08-20 PROCEDURE — 75574 CT ANGIO HRT W/3D IMAGE: CPT | Mod: 26

## 2025-08-21 DIAGNOSIS — I25.10 ATHEROSCLEROTIC HEART DISEASE OF NATIVE CORONARY ARTERY WITHOUT ANGINA PECTORIS: ICD-10-CM

## 2025-08-26 ENCOUNTER — OUTPATIENT (OUTPATIENT)
Dept: OUTPATIENT SERVICES | Facility: HOSPITAL | Age: 60
LOS: 1 days | End: 2025-08-26
Payer: COMMERCIAL

## 2025-08-26 ENCOUNTER — RESULT REVIEW (OUTPATIENT)
Age: 60
End: 2025-08-26

## 2025-08-26 DIAGNOSIS — Z00.8 ENCOUNTER FOR OTHER GENERAL EXAMINATION: ICD-10-CM

## 2025-08-26 DIAGNOSIS — Z13.820 ENCOUNTER FOR SCREENING FOR OSTEOPOROSIS: ICD-10-CM

## 2025-08-26 PROCEDURE — 77080 DXA BONE DENSITY AXIAL: CPT | Mod: 26

## 2025-08-26 PROCEDURE — 77080 DXA BONE DENSITY AXIAL: CPT

## 2025-08-27 DIAGNOSIS — Z13.820 ENCOUNTER FOR SCREENING FOR OSTEOPOROSIS: ICD-10-CM
